# Patient Record
Sex: MALE | Race: OTHER | Employment: FULL TIME | URBAN - METROPOLITAN AREA
[De-identification: names, ages, dates, MRNs, and addresses within clinical notes are randomized per-mention and may not be internally consistent; named-entity substitution may affect disease eponyms.]

---

## 2023-05-22 ENCOUNTER — TELEPHONE (OUTPATIENT)
Dept: FAMILY MEDICINE CLINIC | Facility: CLINIC | Age: 48
End: 2023-05-22

## 2023-05-22 NOTE — TELEPHONE ENCOUNTER
Attempted to call pt, phone number has a busy signal. Pt needs to schedule a NP appt before getting a referral.

## 2023-05-22 NOTE — TELEPHONE ENCOUNTER
----- Message from Clarita Haynes sent at 5/22/2023  3:43 PM EDT -----  Subject: Referral Request    Reason for referral request? Patient was in hospital ED, needs referral to   urologist. Was about 1 mo ago. He did not want to make F/U appt with PCP,   just wants referral for enlarged prostate. He needs this as soon as   possible as he says he is having a hard time. Provider patient wants to be referred to(if known):     Provider Phone Number(if known): Additional Information for Provider?  NEEDS   ---------------------------------------------------------------------------  --------------  Gen Jha INFO    [phone]; OK to leave message on voicemail  ---------------------------------------------------------------------------  --------------